# Patient Record
Sex: FEMALE | Race: WHITE | NOT HISPANIC OR LATINO
[De-identification: names, ages, dates, MRNs, and addresses within clinical notes are randomized per-mention and may not be internally consistent; named-entity substitution may affect disease eponyms.]

---

## 2019-09-29 ENCOUNTER — FORM ENCOUNTER (OUTPATIENT)
Age: 54
End: 2019-09-29

## 2019-10-02 ENCOUNTER — APPOINTMENT (OUTPATIENT)
Dept: SURGICAL ONCOLOGY | Facility: CLINIC | Age: 54
End: 2019-10-02
Payer: COMMERCIAL

## 2019-10-02 VITALS
SYSTOLIC BLOOD PRESSURE: 122 MMHG | BODY MASS INDEX: 29.92 KG/M2 | HEIGHT: 70 IN | DIASTOLIC BLOOD PRESSURE: 80 MMHG | HEART RATE: 102 BPM | WEIGHT: 209 LBS

## 2019-10-02 DIAGNOSIS — Z87.891 PERSONAL HISTORY OF NICOTINE DEPENDENCE: ICD-10-CM

## 2019-10-02 DIAGNOSIS — Z78.9 OTHER SPECIFIED HEALTH STATUS: ICD-10-CM

## 2019-10-02 PROCEDURE — 10061 I&D ABSCESS COMP/MULTIPLE: CPT | Mod: 79

## 2019-10-02 PROCEDURE — 99245 OFF/OP CONSLTJ NEW/EST HI 55: CPT | Mod: 25

## 2019-10-02 NOTE — CONSULT LETTER
[Dear  ___] : Dear  [unfilled], [Consult Letter:] : I had the pleasure of evaluating your patient, [unfilled]. [Consult Closing:] : Thank you very much for allowing me to participate in the care of this patient.  If you have any questions, please do not hesitate to contact me. [Sincerely,] : Sincerely, [DrMatthias  ___] : Dr. CARMONA [FreeTextEntry2] : Marycarmen Dee MD [FreeTextEntry1] : 54 year-old female presents for initial consultation.  She states that she is prone to epidermal inclusion cysts and has had multiple cysts excised in the past.  She currently has a cyst on her left upper back which periodically becomes inflamed.  It is currently inflamed and she had seen Dr. Dee regarding it who sent her here today.   She has not been on antibiotics.  The cyst is painful and tender but she denies any systemic symptoms.\par \par Her past medical history includes Hodgkins disease (1986), thyroid nodules, cervical cancer (s/p hysterectomy 2000).  She had a spinal fusion in 2018.  She has a family history of breast cancer involving a paternal aunt, ovarian cancer in her paternal grandmother, prostate cancer involving 2 paternal uncles, pancreatic cancer in her maternal uncle, colon cancer involving her father at 61.  Rand is up to date with her screening colonoscopies.  Rand is BRCA negative- but has not undergone any comprehensive genetic testing.  \par \par A&P:\par Patient presents with infected epidermal inclusion cyst involving her left upper back.  This has continued to wax and wane overtime.  Explained to patient that excision of cyst wall is the most definitive management.   Lesion I&D'ed in the office today and patient prescribed a course of antibiotics- patient  instructed to pack wound daily.  Will ultimately schedule excision of cyst wall.  Options, risks and benefits of surgery discussed with patient.  I have reviewed the pertinent imaging, bloodwork and pathology.  She has a significant family history of various cancers as well as a personal history of cervical cancer and Hodgkins disease.  Patient counseled on availability of comprehensive genetic testing which she will consider.\par \par Referring MD/Derm: Dr. Marycarmen Dee\par PCP: Dr. Juan Jose Quigley\par  [FreeTextEntry3] : Arian Martines MD, FACS, FASCRS\par , Department of Surgery\par Director of the [facility] [place]\par , Minimally Invasive/Robotic Cancer Surgery, Central & Eastern Divisions\par Division of Surgical Oncology

## 2019-10-02 NOTE — PHYSICAL EXAM
[Normal] : supple, no neck mass and thyroid not enlarged [Normal Neck Lymph Nodes] : normal neck lymph nodes  [Normal Supraclavicular Lymph Nodes] : normal supraclavicular lymph nodes [Normal Axillary Lymph Nodes] : normal axillary lymph nodes [Normal] : oriented to person, place and time, with appropriate affect [de-identified] : Left upper back sebaceous cyst with overlying erythema, tenderness and fluctuance

## 2019-10-02 NOTE — HISTORY OF PRESENT ILLNESS
[de-identified] : 54 year-old female presents for initial consultation.  She states that she is prone to epidermal inclusion cysts and has had multiple cysts excised in the past.  She currently has a cyst on her left upper back which periodically becomes inflamed.  It is currently inflamed and she had seen Dr. Dee regarding it who sent her here today.   She has not been on antibiotics.  The cyst is painful and tender but she denies any systemic symptoms.\par \par Her past medical history includes Hodgkins disease (1986), thyroid nodules, cervical cancer (s/p hysterectomy 2000).  She had a spinal fusion in 2018.  She has a family history of breast cancer involving a paternal aunt, ovarian cancer in her paternal grandmother, prostate cancer involving 2 paternal uncles, pancreatic cancer in her maternal uncle, colon cancer involving her father at 61.  Rand is up to date with her screening colonoscopies.  Rand is BRCA negative- but has not undergone any comprehensive genetic testing.  \par \par Referring MD/Derm: Dr. Marycarmen Dee\par PCP: Dr. Juan Jose Quigley\par

## 2019-10-02 NOTE — ASSESSMENT
[FreeTextEntry1] : 54 year-old female presents for initial consultation.  She states that she is prone to epidermal inclusion cysts and has had multiple cysts excised in the past.  She currently has a cyst on her left upper back which periodically becomes inflamed.  It is currently inflamed and she had seen Dr. Dee regarding it who sent her here today.   She has not been on antibiotics.  The cyst is painful and tender but she denies any systemic symptoms.\par \par Her past medical history includes Hodgkins disease (1986), thyroid nodules, cervical cancer (s/p hysterectomy 2000).  She had a spinal fusion in 2018.  She has a family history of breast cancer involving a paternal aunt, ovarian cancer in her paternal grandmother, prostate cancer involving 2 paternal uncles, pancreatic cancer in her maternal uncle, colon cancer involving her father at 61.  Rand is up to date with her screening colonoscopies.  Rand is BRCA negative- but has not undergone any comprehensive genetic testing.  \par \par A&P:\par Patient presents with infected epidermal inclusion cyst involving her left upper back.  This has continued to wax and wane overtime.  Explained to patient that excision of cyst wall is the most definitive management.   Lesion I&D'ed in the office today and patient prescribed a course of antibiotics- patient  instructed to pack wound daily.  Will ultimately schedule excision of cyst wall.  Options, risks and benefits of surgery discussed with patient.  I have reviewed the pertinent imaging, bloodwork and pathology.  She has a significant family history of various cancers as well as a personal history of cervical cancer and Hodgkins disease.  Patient counseled on availability of comprehensive genetic testing which she will consider.\par \par abscess drained/packed\par \par

## 2019-10-29 ENCOUNTER — APPOINTMENT (OUTPATIENT)
Dept: GASTROENTEROLOGY | Facility: CLINIC | Age: 54
End: 2019-10-29
Payer: COMMERCIAL

## 2019-10-29 VITALS
DIASTOLIC BLOOD PRESSURE: 70 MMHG | HEIGHT: 70 IN | OXYGEN SATURATION: 99 % | TEMPERATURE: 98.7 F | SYSTOLIC BLOOD PRESSURE: 120 MMHG | BODY MASS INDEX: 30.64 KG/M2 | WEIGHT: 214 LBS | HEART RATE: 68 BPM

## 2019-10-29 DIAGNOSIS — K62.1 RECTAL POLYP: ICD-10-CM

## 2019-10-29 DIAGNOSIS — Z12.11 ENCOUNTER FOR SCREENING FOR MALIGNANT NEOPLASM OF COLON: ICD-10-CM

## 2019-10-29 DIAGNOSIS — Z80.0 ENCOUNTER FOR SCREENING FOR MALIGNANT NEOPLASM OF COLON: ICD-10-CM

## 2019-10-29 DIAGNOSIS — Z80.0 FAMILY HISTORY OF MALIGNANT NEOPLASM OF DIGESTIVE ORGANS: ICD-10-CM

## 2019-10-29 DIAGNOSIS — Z83.79 FAMILY HISTORY OF OTHER DISEASES OF THE DIGESTIVE SYSTEM: ICD-10-CM

## 2019-10-29 PROCEDURE — 99243 OFF/OP CNSLTJ NEW/EST LOW 30: CPT

## 2019-10-29 RX ORDER — CEFADROXIL 500 MG/1
500 CAPSULE ORAL TWICE DAILY
Qty: 28 | Refills: 0 | Status: COMPLETED | COMMUNITY
Start: 2019-10-02 | End: 2019-10-29

## 2019-10-29 RX ORDER — DICLOFENAC SODIUM 75 MG/1
75 TABLET, DELAYED RELEASE ORAL
Qty: 60 | Refills: 0 | Status: DISCONTINUED | COMMUNITY
Start: 2019-08-22 | End: 2019-10-29

## 2019-10-29 NOTE — CONSULT LETTER
[Dear  ___] : Dear  [unfilled], [Consult Letter:] : I had the pleasure of evaluating your patient, [unfilled]. [Please see my note below.] : Please see my note below. [Consult Closing:] : Thank you very much for allowing me to participate in the care of this patient.  If you have any questions, please do not hesitate to contact me. [Sincerely,] : Sincerely, [FreeTextEntry3] : Caesar Nicole MD, FACG\par

## 2019-10-29 NOTE — HISTORY OF PRESENT ILLNESS
[Heartburn] : denies heartburn [Nausea] : denies nausea [Vomiting] : denies vomiting [Diarrhea] : denies diarrhea [Constipation] : stable constipation [Yellow Skin Or Eyes (Jaundice)] : denies jaundice [Abdominal Pain] : denies abdominal pain [Abdominal Swelling] : denies abdominal swelling [Rectal Pain] : denies rectal pain [Wt Loss ___ Lbs] : recent [unfilled] ~Upound(s) weight loss [Wt Gain ___ Lbs] : no recent weight gain [GERD] : no gastroesophageal reflux disease [Hiatus Hernia] : no hiatus hernia [Peptic Ulcer Disease] : no peptic ulcer disease [Pancreatitis] : no pancreatitis [Cholelithiasis] : no cholelithiasis [Kidney Stone] : no kidney stone [Inflammatory Bowel Disease] : no inflammatory bowel disease [Irritable Bowel Syndrome] : no irritable bowel syndrome [Diverticulitis] : no diverticulitis [Alcohol Abuse] : no alcohol abuse [Malignancy] : no malignancy [Abdominal Surgery] : no abdominal surgery [Appendectomy] : no appendectomy [Cholecystectomy] : no cholecystectomy [de-identified] : 54 year old woman referred for a screening colonoscopy. her last colonoscopy was over 5 years go and she had a small hyperplastic polyp removed. She has a family history of colon cancer. She denies rectal bleeding,melena or hematemesis.

## 2019-11-05 ENCOUNTER — OUTPATIENT (OUTPATIENT)
Dept: OUTPATIENT SERVICES | Facility: HOSPITAL | Age: 54
LOS: 1 days | End: 2019-11-05
Payer: COMMERCIAL

## 2019-11-05 VITALS
TEMPERATURE: 98 F | SYSTOLIC BLOOD PRESSURE: 136 MMHG | RESPIRATION RATE: 14 BRPM | WEIGHT: 209 LBS | DIASTOLIC BLOOD PRESSURE: 70 MMHG | HEIGHT: 69.25 IN | HEART RATE: 90 BPM | OXYGEN SATURATION: 97 %

## 2019-11-05 DIAGNOSIS — L72.0 EPIDERMAL CYST: ICD-10-CM

## 2019-11-05 DIAGNOSIS — T78.40XS ALLERGY, UNSPECIFIED, SEQUELA: ICD-10-CM

## 2019-11-05 DIAGNOSIS — Z98.890 OTHER SPECIFIED POSTPROCEDURAL STATES: Chronic | ICD-10-CM

## 2019-11-05 DIAGNOSIS — Z90.710 ACQUIRED ABSENCE OF BOTH CERVIX AND UTERUS: Chronic | ICD-10-CM

## 2019-11-05 LAB
ANION GAP SERPL CALC-SCNC: 16 MMO/L — HIGH (ref 7–14)
BUN SERPL-MCNC: 17 MG/DL — SIGNIFICANT CHANGE UP (ref 7–23)
CALCIUM SERPL-MCNC: 10.1 MG/DL — SIGNIFICANT CHANGE UP (ref 8.4–10.5)
CHLORIDE SERPL-SCNC: 101 MMOL/L — SIGNIFICANT CHANGE UP (ref 98–107)
CO2 SERPL-SCNC: 23 MMOL/L — SIGNIFICANT CHANGE UP (ref 22–31)
CREAT SERPL-MCNC: 0.75 MG/DL — SIGNIFICANT CHANGE UP (ref 0.5–1.3)
GLUCOSE SERPL-MCNC: 94 MG/DL — SIGNIFICANT CHANGE UP (ref 70–99)
HCT VFR BLD CALC: 43.3 % — SIGNIFICANT CHANGE UP (ref 34.5–45)
HGB BLD-MCNC: 14 G/DL — SIGNIFICANT CHANGE UP (ref 11.5–15.5)
MCHC RBC-ENTMCNC: 28.9 PG — SIGNIFICANT CHANGE UP (ref 27–34)
MCHC RBC-ENTMCNC: 32.3 % — SIGNIFICANT CHANGE UP (ref 32–36)
MCV RBC AUTO: 89.5 FL — SIGNIFICANT CHANGE UP (ref 80–100)
NRBC # FLD: 0 K/UL — SIGNIFICANT CHANGE UP (ref 0–0)
PLATELET # BLD AUTO: 212 K/UL — SIGNIFICANT CHANGE UP (ref 150–400)
PMV BLD: 9.7 FL — SIGNIFICANT CHANGE UP (ref 7–13)
POTASSIUM SERPL-MCNC: 4.3 MMOL/L — SIGNIFICANT CHANGE UP (ref 3.5–5.3)
POTASSIUM SERPL-SCNC: 4.3 MMOL/L — SIGNIFICANT CHANGE UP (ref 3.5–5.3)
RBC # BLD: 4.84 M/UL — SIGNIFICANT CHANGE UP (ref 3.8–5.2)
RBC # FLD: 12.4 % — SIGNIFICANT CHANGE UP (ref 10.3–14.5)
SODIUM SERPL-SCNC: 140 MMOL/L — SIGNIFICANT CHANGE UP (ref 135–145)
WBC # BLD: 6.98 K/UL — SIGNIFICANT CHANGE UP (ref 3.8–10.5)
WBC # FLD AUTO: 6.98 K/UL — SIGNIFICANT CHANGE UP (ref 3.8–10.5)

## 2019-11-05 PROCEDURE — 93010 ELECTROCARDIOGRAM REPORT: CPT

## 2019-11-05 RX ORDER — SODIUM CHLORIDE 9 MG/ML
1000 INJECTION, SOLUTION INTRAVENOUS
Refills: 0 | Status: DISCONTINUED | OUTPATIENT
Start: 2019-11-11 | End: 2019-11-28

## 2019-11-05 NOTE — H&P PST ADULT - MUSCULOSKELETAL
details… detailed exam no joint erythema/no joint warmth/no joint swelling/no calf tenderness/normal strength/ROM intact

## 2019-11-05 NOTE — H&P PST ADULT - RS GEN PE MLT RESP DETAILS PC
clear to auscultation bilaterally/breath sounds equal/good air movement/no rhonchi/respirations non-labored/no rales/no wheezes

## 2019-11-05 NOTE — H&P PST ADULT - NSICDXPROBLEM_GEN_ALL_CORE_FT
PROBLEM DIAGNOSES  Problem: Epidermal cyst  Assessment and Plan: wide excision back mass   Medical clearance as per Dr Martines PROBLEM DIAGNOSES  Problem: Epidermal cyst  Assessment and Plan: wide excision back mass   Medical clearance as per Dr Martines     Problem: Allergy, sequela  Assessment and Plan: PCN, epinepherine ; OR faxed

## 2019-11-05 NOTE — H&P PST ADULT - NSICDXPASTSURGICALHX_GEN_ALL_CORE_FT
PAST SURGICAL HISTORY:  H/O lymph node biopsy     S/P hysterectomy 2000    S/P lumbar spine operation hardware in place

## 2019-11-05 NOTE — H&P PST ADULT - HISTORY OF PRESENT ILLNESS
This is a 54 y.o. female s/p intra-office excision of epidermal cyst . Pt now for wide excision . Pt offers no complaints in pst .

## 2019-11-10 ENCOUNTER — TRANSCRIPTION ENCOUNTER (OUTPATIENT)
Age: 54
End: 2019-11-10

## 2019-11-11 ENCOUNTER — RESULT REVIEW (OUTPATIENT)
Age: 54
End: 2019-11-11

## 2019-11-11 ENCOUNTER — APPOINTMENT (OUTPATIENT)
Dept: SURGICAL ONCOLOGY | Facility: HOSPITAL | Age: 54
End: 2019-11-11

## 2019-11-11 ENCOUNTER — OUTPATIENT (OUTPATIENT)
Dept: OUTPATIENT SERVICES | Facility: HOSPITAL | Age: 54
LOS: 1 days | Discharge: HOME | End: 2019-11-11
Payer: COMMERCIAL

## 2019-11-11 VITALS
OXYGEN SATURATION: 96 % | RESPIRATION RATE: 14 BRPM | TEMPERATURE: 98 F | HEIGHT: 69.25 IN | WEIGHT: 209 LBS | SYSTOLIC BLOOD PRESSURE: 114 MMHG | DIASTOLIC BLOOD PRESSURE: 76 MMHG | HEART RATE: 87 BPM

## 2019-11-11 VITALS
OXYGEN SATURATION: 97 % | HEART RATE: 83 BPM | DIASTOLIC BLOOD PRESSURE: 67 MMHG | RESPIRATION RATE: 16 BRPM | TEMPERATURE: 98 F | SYSTOLIC BLOOD PRESSURE: 120 MMHG

## 2019-11-11 DIAGNOSIS — Z98.890 OTHER SPECIFIED POSTPROCEDURAL STATES: Chronic | ICD-10-CM

## 2019-11-11 DIAGNOSIS — Z90.710 ACQUIRED ABSENCE OF BOTH CERVIX AND UTERUS: Chronic | ICD-10-CM

## 2019-11-11 DIAGNOSIS — L72.0 EPIDERMAL CYST: ICD-10-CM

## 2019-11-11 PROCEDURE — 13102 CMPLX RPR TRUNK ADDL 5CM/<: CPT

## 2019-11-11 PROCEDURE — 13101 CMPLX RPR TRUNK 2.6-7.5 CM: CPT

## 2019-11-11 PROCEDURE — 88305 TISSUE EXAM BY PATHOLOGIST: CPT | Mod: 26

## 2019-11-11 PROCEDURE — 21933 EXC BACK TUM DEEP 5 CM/>: CPT

## 2019-11-11 RX ORDER — FENTANYL CITRATE 50 UG/ML
50 INJECTION INTRAVENOUS
Refills: 0 | Status: DISCONTINUED | OUTPATIENT
Start: 2019-11-11 | End: 2019-11-11

## 2019-11-11 RX ORDER — SODIUM CHLORIDE 9 MG/ML
1000 INJECTION, SOLUTION INTRAVENOUS
Refills: 0 | Status: DISCONTINUED | OUTPATIENT
Start: 2019-11-11 | End: 2019-11-28

## 2019-11-11 RX ORDER — ONDANSETRON 8 MG/1
4 TABLET, FILM COATED ORAL ONCE
Refills: 0 | Status: DISCONTINUED | OUTPATIENT
Start: 2019-11-11 | End: 2019-11-28

## 2019-11-11 RX ORDER — ASCORBIC ACID 60 MG
4 TABLET,CHEWABLE ORAL
Qty: 0 | Refills: 0 | DISCHARGE

## 2019-11-11 RX ORDER — ACETAMINOPHEN 500 MG
2 TABLET ORAL
Qty: 0 | Refills: 0 | DISCHARGE

## 2019-11-11 RX ORDER — PREGABALIN 225 MG/1
1 CAPSULE ORAL
Qty: 0 | Refills: 0 | DISCHARGE

## 2019-11-11 NOTE — BRIEF OPERATIVE NOTE - NSICDXBRIEFPOSTOP_GEN_ALL_CORE_FT
POST-OP DIAGNOSIS:  Epidermal inclusion cyst 11-Nov-2019 08:43:55  Charles Huffman
POST-OP DIAGNOSIS:  Epidermal inclusion cyst 11-Nov-2019 08:43:55  Charles Huffman

## 2019-11-11 NOTE — ASU DISCHARGE PLAN (ADULT/PEDIATRIC) - CARE PROVIDER_API CALL
Arian Martines)  ColonRectal Surgery; Surgery  [address] [place] [address]  Phone: [phone]  Fax: [phone]  Follow Up Time: 1 week

## 2019-11-11 NOTE — BRIEF OPERATIVE NOTE - NSICDXBRIEFPROCEDURE_GEN_ALL_CORE_FT
PROCEDURES:  Excision of benign skin lesion (excluding skin tags) of back, over 4.0cm 11-Nov-2019 08:43:24  Charles Huffman
PROCEDURES:  Complex repair, wound, torso, 2.6 cm to 7.5 cm 11-Nov-2019 09:18:17  Rusty Victoria

## 2019-11-11 NOTE — ASU DISCHARGE PLAN (ADULT/PEDIATRIC) - ASU DC SPECIAL INSTRUCTIONSFT
WOUND CARE:  Please keep incisions clean and dry. Please do not Scrub or rub incisions. Do not use lotion or powder on incisions.   BATHING: Please do not submerge wound underwater. You may shower and pat dry  ACTIVITY: No heavy lifting or straining. Otherwise, you may return to your usual level of physical activity  DIET: Normal diet  NOTIFY YOUR SURGEON IF: You have any bleeding that does not stop, any pus draining from your wounds, any fever (over 101 F), or if your pain is not controlled on Tylenol.  FOLLOW-UP: Please follow up with Dr. Martines in 1-2 weeks. Call for an appointment.

## 2019-11-11 NOTE — BRIEF OPERATIVE NOTE - OPERATION/FINDINGS
Epidermal inclusion cyst of posterior L shoulder. Wide local excision with complex closure by Plastics

## 2019-11-11 NOTE — ASU DISCHARGE PLAN (ADULT/PEDIATRIC) - PROCEDURE
Wide local excision of Epidermal inclusion cyst of posterior left shoulder with complex closure by Plastics

## 2019-11-11 NOTE — ASU DISCHARGE PLAN (ADULT/PEDIATRIC) - CALL YOUR DOCTOR IF YOU HAVE ANY OF THE FOLLOWING:
Fever greater than (need to indicate Fahrenheit or Celsius)/Swelling that gets worse/Pain not relieved by Medications/Bleeding that does not stop Inability to tolerate liquids or foods/Fever greater than (need to indicate Fahrenheit or Celsius)/Swelling that gets worse/Pain not relieved by Medications/Bleeding that does not stop/Nausea and vomiting that does not stop

## 2019-11-19 ENCOUNTER — APPOINTMENT (OUTPATIENT)
Dept: PLASTIC SURGERY | Facility: CLINIC | Age: 54
End: 2019-11-19
Payer: COMMERCIAL

## 2019-11-19 PROCEDURE — 99024 POSTOP FOLLOW-UP VISIT: CPT

## 2019-11-20 LAB — SURGICAL PATHOLOGY STUDY: SIGNIFICANT CHANGE UP

## 2019-12-02 ENCOUNTER — APPOINTMENT (OUTPATIENT)
Dept: SURGICAL ONCOLOGY | Facility: CLINIC | Age: 54
End: 2019-12-02
Payer: COMMERCIAL

## 2019-12-02 VITALS
BODY MASS INDEX: 30.92 KG/M2 | SYSTOLIC BLOOD PRESSURE: 129 MMHG | HEART RATE: 108 BPM | OXYGEN SATURATION: 96 % | HEIGHT: 70 IN | DIASTOLIC BLOOD PRESSURE: 85 MMHG | WEIGHT: 216 LBS | TEMPERATURE: 98.6 F | RESPIRATION RATE: 18 BRPM

## 2019-12-02 PROCEDURE — 99024 POSTOP FOLLOW-UP VISIT: CPT

## 2019-12-02 NOTE — ASSESSMENT
[FreeTextEntry1] : \par 54 year-old female presents for initial postop visit, status post excision of epidermal inclusion cyst from the left back on 11/11/19.  Plastic closure was performed by Dr. Ezekiel Jalloh.  Final pathology demonstrated hyperplastic scar and no residual cyst.  She is feeling well and denies pain fever or chills.  She recently saw her dermatologist who biopsied 2 dermal lesions on her back, one of which was basal cell carcinoma (which was excised) and pathology is pending on the second biopsy which was performed later on.  \par \par Previous pertinent history is as follows:\par \par She was initially seen in consultation on 10/2/19.  She stated that she is prone to epidermal inclusion cysts and has had multiple cysts excised in the past.  She presented with a cyst on her left upper back which periodically becomes inflamed.  It was inflamed and she had seen Dr. Dee regarding it who referred her to me.   She had not been on antibiotics.  The cyst was painful and tender but she denied any systemic symptoms.\par \par The lesion was I&D'ed in the office and she was prescribed a course of antibiotics.\par \par Her past medical history includes Hodgkins disease (1986), thyroid nodules, cervical cancer (s/p hysterectomy 2000).  She had a spinal fusion in 2018.  She has a family history of breast cancer involving a paternal aunt, ovarian cancer in her paternal grandmother, prostate cancer involving 2 paternal uncles, pancreatic cancer in her maternal uncle, colon cancer involving her father at 61.  Rand is up to date with her screening colonoscopies.  Rand is BRCA negative- but has not undergone any comprehensive genetic testing.  \par \par A&P:\par Status post excision of epidermal inclusion cyst from the left back on 11/11/19.  Plastic closure was performed by Dr. Ezekiel Jalloh.  Final pathology demonstrated hyperplastic scar and no residual cyst.  Patient doing well clinically. Continue ongoing dermatologic surveillance and return to office as needed.  I have enclosed a copy of the pathology report for your records. \par

## 2019-12-02 NOTE — PHYSICAL EXAM
[Normal] : well developed, well nourished, in no acute distress [de-identified] : Left upper back incision well-approximated with no evidence of infection or seroma.

## 2019-12-02 NOTE — CONSULT LETTER
[Consult Letter:] : I had the pleasure of evaluating your patient, [unfilled]. [Dear  ___] : Dear  [unfilled], [Sincerely,] : Sincerely, [Consult Closing:] : Thank you very much for allowing me to participate in the care of this patient.  If you have any questions, please do not hesitate to contact me. [DrMatthias  ___] : Dr. CARMONA [FreeTextEntry2] : Marycarmen Dee MD [FreeTextEntry3] : Arian Martines MD, FACS, FASCRS\par , Department of Surgery\par Director of the [facility] [place]\par , Minimally Invasive/Robotic Cancer Surgery, Central & Eastern Divisions\par Division of Surgical Oncology  [FreeTextEntry1] : \par 54 year-old female presents for initial postop visit, status post excision of epidermal inclusion cyst from the left back on 11/11/19.  Plastic closure was performed by Dr. Ezekiel Jalloh.  Final pathology demonstrated hyperplastic scar and no residual cyst.  She is feeling well and denies pain fever or chills.  She recently saw her dermatologist who biopsied 2 dermal lesions on her back, one of which was basal cell carcinoma (which was excised) and pathology is pending on the second biopsy which was performed later on.  \par \par Previous pertinent history is as follows:\par \par She was initially seen in consultation on 10/2/19.  She stated that she is prone to epidermal inclusion cysts and has had multiple cysts excised in the past.  She presented with a cyst on her left upper back which periodically becomes inflamed.  It was inflamed and she had seen Dr. Dee regarding it who referred her to me.   She had not been on antibiotics.  The cyst was painful and tender but she denied any systemic symptoms.\par \par The lesion was I&D'ed in the office and she was prescribed a course of antibiotics.\par \par Her past medical history includes Hodgkins disease (1986), thyroid nodules, cervical cancer (s/p hysterectomy 2000).  She had a spinal fusion in 2018.  She has a family history of breast cancer involving a paternal aunt, ovarian cancer in her paternal grandmother, prostate cancer involving 2 paternal uncles, pancreatic cancer in her maternal uncle, colon cancer involving her father at 61.  Rand is up to date with her screening colonoscopies.  Rand is BRCA negative- but has not undergone any comprehensive genetic testing.  \par \par A&P:\par Status post excision of epidermal inclusion cyst from the left back on 11/11/19.  Plastic closure was performed by Dr. Ezekiel Jalloh.  Final pathology demonstrated hyperplastic scar and no residual cyst.  Patient doing well clinically. Continue ongoing dermatologic surveillance and return to office as needed.  I have enclosed a copy of the pathology report for your records. \par \par Referring MD/Derm: Dr. Marycarmen Dee\par PCP: Dr. Juan Jose Quigley\par \par enclose pathology

## 2019-12-02 NOTE — REASON FOR VISIT
[Post-Op] : a post-op for [FreeTextEntry2] : status post excision of epidermal inclusion cyst from the left back on 11/11/19

## 2019-12-11 ENCOUNTER — APPOINTMENT (OUTPATIENT)
Dept: GASTROENTEROLOGY | Facility: AMBULATORY MEDICAL SERVICES | Age: 54
End: 2019-12-11
Payer: COMMERCIAL

## 2019-12-11 PROCEDURE — 45380 COLONOSCOPY AND BIOPSY: CPT | Mod: 33

## 2020-01-21 ENCOUNTER — TRANSCRIPTION ENCOUNTER (OUTPATIENT)
Age: 55
End: 2020-01-21

## 2020-02-23 NOTE — HISTORY OF PRESENT ILLNESS
[FreeTextEntry1] : 54 year female presents for follow up status post Epidermal cyst excision left back with complex closure. Patient is doing well no fever no chills no drainage pain well-controlled

## 2020-02-23 NOTE — REASON FOR VISIT
[Post Op: _________] : a [unfilled] post op visit [FreeTextEntry1] : DOS 11/11/19 s/p left back epidermal cyst excision and closure. Pt states she is doing well.

## 2020-04-02 NOTE — H&P PST ADULT - FALLEN IN THE PAST
no

## 2020-07-24 NOTE — HISTORY OF PRESENT ILLNESS
Patient was not ready to schedule echo. He will call us to get scheduled when he is ready. [de-identified] : 54 year-old female presents for initial postop visit, status post excision of epidermal inclusion cyst from the left back on 11/11/19.  Plastic closure was performed by Dr. Ezekiel Jalloh.  Final pathology demonstrated hyperplastic scar and no residual cyst.  She is feeling well and denies pain fever or chills.  She recently saw her dermatologist who biopsied 2 dermal lesions on her back, one of which was basal cell carcinoma (which was excised) and pathology is pending on the second biopsy which was performed later on.  \par \par Previous pertinent history is as follows:\par \par She was initially seen in consultation on 10/2/19.  She stated that she is prone to epidermal inclusion cysts and has had multiple cysts excised in the past.  She presented with a cyst on her left upper back which periodically becomes inflamed.  It was inflamed and she had seen Dr. Dee regarding it who referred her to me.   She had not been on antibiotics.  The cyst was painful and tender but she denied any systemic symptoms.\par \par The lesion was I&D'ed in the office and she was prescribed a course of antibiotics.\par \par Her past medical history includes Hodgkins disease (1986), thyroid nodules, cervical cancer (s/p hysterectomy 2000).  She had a spinal fusion in 2018.  She has a family history of breast cancer involving a paternal aunt, ovarian cancer in her paternal grandmother, prostate cancer involving 2 paternal uncles, pancreatic cancer in her maternal uncle, colon cancer involving her father at 61.  Rand is up to date with her screening colonoscopies.  Rand is BRCA negative- but has not undergone any comprehensive genetic testing.  \par \par Referring MD/Derm: Dr. Marycarmen Dee\par PCP: Dr. Juan Jose Quigley\par

## 2020-09-03 ENCOUNTER — APPOINTMENT (OUTPATIENT)
Dept: PLASTIC SURGERY | Facility: CLINIC | Age: 55
End: 2020-09-03
Payer: COMMERCIAL

## 2020-09-03 VITALS
SYSTOLIC BLOOD PRESSURE: 107 MMHG | HEART RATE: 91 BPM | WEIGHT: 167 LBS | BODY MASS INDEX: 23.91 KG/M2 | HEIGHT: 70 IN | TEMPERATURE: 98 F | DIASTOLIC BLOOD PRESSURE: 69 MMHG | OXYGEN SATURATION: 97 %

## 2020-09-03 PROCEDURE — XXXXX: CPT

## 2020-09-22 ENCOUNTER — LABORATORY RESULT (OUTPATIENT)
Age: 55
End: 2020-09-22

## 2020-09-22 ENCOUNTER — APPOINTMENT (OUTPATIENT)
Dept: PLASTIC SURGERY | Facility: CLINIC | Age: 55
End: 2020-09-22
Payer: COMMERCIAL

## 2020-09-22 PROCEDURE — 14001 TIS TRNFR TRUNK 10.1-30SQCM: CPT

## 2020-09-22 PROCEDURE — 21931 EXC BACK LES SC 3 CM/>: CPT

## 2020-09-22 NOTE — CONSULT LETTER
[Dear  ___] : Dear  [unfilled], [Consult Letter:] : I had the pleasure of evaluating your patient, [unfilled]. [Consult Closing:] : Thank you very much for allowing me to participate in the care of this patient.  If you have any questions, please do not hesitate to contact me. [Please see my note below.] : Please see my note below. [Sincerely,] : Sincerely, [FreeTextEntry3] : Gavin Schwarz MD

## 2020-09-30 ENCOUNTER — APPOINTMENT (OUTPATIENT)
Dept: PLASTIC SURGERY | Facility: CLINIC | Age: 55
End: 2020-09-30
Payer: COMMERCIAL

## 2020-09-30 VITALS
TEMPERATURE: 98.1 F | HEART RATE: 85 BPM | BODY MASS INDEX: 23.91 KG/M2 | SYSTOLIC BLOOD PRESSURE: 110 MMHG | DIASTOLIC BLOOD PRESSURE: 71 MMHG | WEIGHT: 167 LBS | OXYGEN SATURATION: 100 % | HEIGHT: 70 IN

## 2020-09-30 PROCEDURE — 99024 POSTOP FOLLOW-UP VISIT: CPT

## 2020-10-13 ENCOUNTER — APPOINTMENT (OUTPATIENT)
Dept: PLASTIC SURGERY | Facility: CLINIC | Age: 55
End: 2020-10-13
Payer: COMMERCIAL

## 2020-10-13 VITALS
HEART RATE: 83 BPM | BODY MASS INDEX: 23.91 KG/M2 | TEMPERATURE: 97.5 F | HEIGHT: 70 IN | DIASTOLIC BLOOD PRESSURE: 63 MMHG | WEIGHT: 167 LBS | OXYGEN SATURATION: 100 % | SYSTOLIC BLOOD PRESSURE: 109 MMHG

## 2020-10-13 PROCEDURE — 99024 POSTOP FOLLOW-UP VISIT: CPT

## 2020-11-15 ENCOUNTER — FORM ENCOUNTER (OUTPATIENT)
Age: 55
End: 2020-11-15

## 2020-12-21 PROBLEM — Z12.11 ENCOUNTER FOR COLONOSCOPY IN PATIENT WITH FAMILY HISTORY OF COLON CANCER: Status: RESOLVED | Noted: 2019-10-29 | Resolved: 2020-12-21

## 2020-12-28 ENCOUNTER — FORM ENCOUNTER (OUTPATIENT)
Age: 55
End: 2020-12-28

## 2021-01-14 NOTE — REASON FOR VISIT
[Procedure: _________] : a [unfilled] procedure visit [FreeTextEntry1] : Pt presents in the office today for a excisional biopsy and closure of lower back mass.

## 2021-01-14 NOTE — PROCEDURE
[Nl] : None [FreeTextEntry1] : MASS OF MID BACK [FreeTextEntry2] : EXCISIONAL BIOPSY OF MID BACK MASS 3 cm, local advancement flap 4 x 3cm [FreeTextEntry6] : After the area was prepped and draped, the area was infiltrated with 1%lidocaine with epi. The previous scar/mass was marked and incised. The mass was dissected from surrounding tissue. The mass and overlying skin was removed. It measured 3cm. This was sent to pathology.\par \par The wound was then irrigated, and given size and location of the wound, a local flap was marked. The flap was incised and elevated. The flap was then advanced into the area providing a good reconstruction. The flap was then inset with 3-0 monocryl for the dermis and 4-0 nylon for the skin. The total area including the wound was 4 x 3cm. A dressing was applied. Pt tolerated well procedure.\par  [FreeTextEntry7] : Mid back mass

## 2021-11-15 NOTE — H&P PST ADULT - [place] EXT PE MLT D E PC
What Type Of Note Output Would You Prefer (Optional)?: Standard Output Is The Patient Presenting As Previously Scheduled?: Yes How Severe Is Your Rash?: severe Is This A New Presentation, Or A Follow-Up?: Rash Additional History: About 8 years ago, she flared with this itching and rash, it lasted about a month, treatment with gabapentin and topical steroids. It has started back over the last few months (neck, hands, arms), now it is flaring all over. \\n\\nShe recently got a tattoo colored in September. She is wondering if that influenced her skin. She has been under more stress recently. \\n\\nNo changes in her health, she has history of seasonal allergies,  but does not take antihistamines regularly. \\n\\n no pedal edema/no clubbing/no cyanosis

## 2022-04-05 ENCOUNTER — APPOINTMENT (OUTPATIENT)
Dept: ORTHOPEDIC SURGERY | Facility: CLINIC | Age: 57
End: 2022-04-05
Payer: COMMERCIAL

## 2022-04-05 VITALS — BODY MASS INDEX: 30.06 KG/M2 | HEIGHT: 70 IN | WEIGHT: 210 LBS

## 2022-04-05 DIAGNOSIS — M93.271 OSTEOCHONDRITIS DISSECANS, RIGHT ANKLE AND JOINTS OF RIGHT FOOT: ICD-10-CM

## 2022-04-05 DIAGNOSIS — Z78.9 OTHER SPECIFIED HEALTH STATUS: ICD-10-CM

## 2022-04-05 PROCEDURE — 99213 OFFICE O/P EST LOW 20 MIN: CPT

## 2022-04-05 NOTE — HISTORY OF PRESENT ILLNESS
[3] : 3 [0] : 0 [Intermittent] : intermittent [Rest] : rest [Physical therapy] : physical therapy [Exercising] : exercising [Retired] : Work status: retired [de-identified] : 11/23/21: felt pop in achilles >3 months ago. saw dpm at the time, but no intervention. having continued pain/weakness. no prior foot probs. no dm/tob. not working. c/o lateral foot numbness. patient w/ h/o prior lumbar surgery\par 12/7/21: Here for R ankle f/u and MRI results. No changes in pain.\par 2/8/22: mild improvement. going to PT.\par 4/5/22: Still c/o minor tenderness over Achilles. States she doesn't experience her ankle giving out on her as often as it used to.  Attending PT. had improvement from injection [] : no [FreeTextEntry1] : right ankle [FreeTextEntry6] : soreness [FreeTextEntry8] : increased activity [de-identified] : physical therapy

## 2022-04-05 NOTE — PHYSICAL EXAM
[Right] : right foot and ankle [NL (40)] : plantar flexion 40 degrees [NL 30)] : inversion 30 degrees [NL (20)] : eversion 20 degrees [5___] : plantar flexion 5[unfilled]/5 [2+] : dorsalis pedis pulse: 2+ [] : patient ambulates without assistive device [de-identified] : decreased sensation lateral foot at baseline

## 2022-04-14 ENCOUNTER — APPOINTMENT (OUTPATIENT)
Dept: SURGERY | Facility: CLINIC | Age: 57
End: 2022-04-14
Payer: COMMERCIAL

## 2022-04-14 VITALS
HEIGHT: 70 IN | WEIGHT: 210 LBS | BODY MASS INDEX: 30.06 KG/M2 | DIASTOLIC BLOOD PRESSURE: 84 MMHG | OXYGEN SATURATION: 98 % | TEMPERATURE: 98.7 F | SYSTOLIC BLOOD PRESSURE: 126 MMHG | HEART RATE: 106 BPM

## 2022-04-14 PROCEDURE — 10061 I&D ABSCESS COMP/MULTIPLE: CPT | Mod: 59

## 2022-04-14 PROCEDURE — 99202 OFFICE O/P NEW SF 15 MIN: CPT | Mod: 25

## 2022-04-14 NOTE — PHYSICAL EXAM
[Normal Breath Sounds] : Normal breath sounds [Normal Heart Sounds] : normal heart sounds [Normal Rate and Rhythm] : normal rate and rhythm [Purpura] : no purpura  [Petechiae] : no petechiae [Skin Ulcer] : no ulcer [Skin Induration] : induration [Alert] : alert [Oriented to Person] : oriented to person [Oriented to Place] : oriented to place [Oriented to Time] : oriented to time [Calm] : calm [de-identified] : well developed female in no distress [de-identified] : benign [de-identified] : infection of right middle finger

## 2022-04-14 NOTE — ASSESSMENT
[FreeTextEntry1] : pt had an incision and drainage of her right middle finger and the incision was packed open and a DSD was placed.

## 2022-04-14 NOTE — HISTORY OF PRESENT ILLNESS
[de-identified] : infected right middle finger [de-identified] : 56 year old white female who presents c/o pain and swelling of her right middle finger for the last week, She denies any trauma to the area. No fevers or chills.

## 2022-04-19 ENCOUNTER — APPOINTMENT (OUTPATIENT)
Dept: SURGERY | Facility: CLINIC | Age: 57
End: 2022-04-19
Payer: COMMERCIAL

## 2022-04-19 PROCEDURE — 99024 POSTOP FOLLOW-UP VISIT: CPT

## 2022-04-19 NOTE — PHYSICAL EXAM
[Normal Breath Sounds] : Normal breath sounds [Normal Heart Sounds] : normal heart sounds [Normal Rate and Rhythm] : normal rate and rhythm [Calm] : calm [de-identified] : well developed female in no distress [de-identified] : benign [de-identified] : figer incision closed and swollen.

## 2022-04-21 ENCOUNTER — APPOINTMENT (OUTPATIENT)
Dept: SURGERY | Facility: CLINIC | Age: 57
End: 2022-04-21

## 2022-04-25 ENCOUNTER — APPOINTMENT (OUTPATIENT)
Dept: SURGERY | Facility: CLINIC | Age: 57
End: 2022-04-25
Payer: COMMERCIAL

## 2022-04-25 VITALS — TEMPERATURE: 98 F

## 2022-04-25 PROCEDURE — 99212 OFFICE O/P EST SF 10 MIN: CPT

## 2022-04-25 NOTE — PHYSICAL EXAM
[Normal Breath Sounds] : Normal breath sounds [Normal Heart Sounds] : normal heart sounds [Normal Rate and Rhythm] : normal rate and rhythm [Calm] : calm [de-identified] : well developed female in no distress [de-identified] : benign [de-identified] : finger clean and closing with decreased erythema

## 2022-04-25 NOTE — HISTORY OF PRESENT ILLNESS
[de-identified] : infected right middle finger [de-identified] : pt improved, decreased swelling and pain, drainage has stopped. Pt denies any fevers or chills.

## 2022-05-10 ENCOUNTER — APPOINTMENT (OUTPATIENT)
Dept: SURGERY | Facility: CLINIC | Age: 57
End: 2022-05-10
Payer: COMMERCIAL

## 2022-05-10 DIAGNOSIS — L08.9 LOCAL INFECTION OF THE SKIN AND SUBCUTANEOUS TISSUE, UNSPECIFIED: ICD-10-CM

## 2022-05-10 PROCEDURE — 99212 OFFICE O/P EST SF 10 MIN: CPT

## 2022-05-10 NOTE — HISTORY OF PRESENT ILLNESS
[de-identified] : infected right middle finger [de-identified] : pt improved, decreased pain, without swelling, drainage has stopped, denies any fevers or chills.

## 2022-05-10 NOTE — PHYSICAL EXAM
[Normal Breath Sounds] : Normal breath sounds [Normal Heart Sounds] : normal heart sounds [Normal Rate and Rhythm] : normal rate and rhythm [Calm] : calm [de-identified] : well developed female in no distress [de-identified] : without adenopathy [de-identified] : benign [de-identified] : finger is clean and closed, without erythema or drainage

## 2022-05-31 ENCOUNTER — NON-APPOINTMENT (OUTPATIENT)
Age: 57
End: 2022-05-31

## 2022-05-31 ENCOUNTER — APPOINTMENT (OUTPATIENT)
Dept: ORTHOPEDIC SURGERY | Facility: CLINIC | Age: 57
End: 2022-05-31
Payer: COMMERCIAL

## 2022-05-31 VITALS — WEIGHT: 210 LBS | BODY MASS INDEX: 30.06 KG/M2 | HEIGHT: 70 IN

## 2022-05-31 DIAGNOSIS — M76.61 ACHILLES TENDINITIS, RIGHT LEG: ICD-10-CM

## 2022-05-31 PROCEDURE — 99213 OFFICE O/P EST LOW 20 MIN: CPT

## 2022-05-31 NOTE — PHYSICAL EXAM
[Right] : right foot and ankle [5___] : eversion 5[unfilled]/5 [] : non-antalgic [de-identified] : decreased sensation lateral foot at baseline

## 2022-07-26 ENCOUNTER — APPOINTMENT (OUTPATIENT)
Dept: OBGYN | Facility: CLINIC | Age: 57
End: 2022-07-26

## 2022-07-26 VITALS
HEART RATE: 101 BPM | HEIGHT: 70 IN | RESPIRATION RATE: 12 BRPM | SYSTOLIC BLOOD PRESSURE: 132 MMHG | WEIGHT: 212 LBS | BODY MASS INDEX: 30.35 KG/M2 | DIASTOLIC BLOOD PRESSURE: 88 MMHG

## 2022-07-26 DIAGNOSIS — L72.0 EPIDERMAL CYST: ICD-10-CM

## 2022-07-26 PROCEDURE — 99213 OFFICE O/P EST LOW 20 MIN: CPT

## 2022-07-26 NOTE — PLAN
[FreeTextEntry1] : Ceftin x 1 week\par Warm soaks\par Consider I/D if without resolution.\par Patient is well aware.

## 2022-07-26 NOTE — PHYSICAL EXAM
[Appropriately responsive] : appropriately responsive [Alert] : alert [FreeTextEntry8] : 2cm Right sebacious cyst, inflamed..

## 2022-07-26 NOTE — HISTORY OF PRESENT ILLNESS
[postmenopausal] : postmenopausal [N] : Patient does not use contraception [Y] : Positive pregnancy history [Hot Flashes] : hot flashes [Currently Active] : currently active [Men] : men [No] : No [TextBox_4] : Who presents today to evaluate a lesion that she identified recently in her right groin.  This lesion is tender to touch without discharge. [Mammogramdate] : 2021 [TextBox_19] : n/a [BreastSonogramDate] : 2021 [TextBox_25] : n/a [PapSmeardate] : 2022 [TextBox_31] : n/a [BoneDensityDate] : 2021 [TextBox_37] : n/a [ColonoscopyDate] : 2019 [TextBox_43] : n/a [LMPDate] : 2000 [PGHxTotal] : 2 [[facility]] : 1 [FreeTextEntry1] : 2000

## 2022-07-27 DIAGNOSIS — Z82.49 FAMILY HISTORY OF ISCHEMIC HEART DISEASE AND OTHER DISEASES OF THE CIRCULATORY SYSTEM: ICD-10-CM

## 2022-07-27 RX ORDER — CEFUROXIME AXETIL 250 MG/1
250 TABLET ORAL
Qty: 14 | Refills: 0 | Status: DISCONTINUED | COMMUNITY
Start: 2022-07-26 | End: 2022-07-27

## 2023-12-01 DIAGNOSIS — Z11.3 ENCOUNTER FOR SCREENING FOR INFECTIONS WITH A PREDOMINANTLY SEXUAL MODE OF TRANSMISSION: ICD-10-CM

## 2023-12-04 ENCOUNTER — APPOINTMENT (OUTPATIENT)
Dept: OBGYN | Facility: CLINIC | Age: 58
End: 2023-12-04
Payer: COMMERCIAL

## 2023-12-04 VITALS
WEIGHT: 189.38 LBS | OXYGEN SATURATION: 98 % | RESPIRATION RATE: 14 BRPM | HEART RATE: 92 BPM | HEIGHT: 70 IN | SYSTOLIC BLOOD PRESSURE: 120 MMHG | DIASTOLIC BLOOD PRESSURE: 74 MMHG | BODY MASS INDEX: 27.11 KG/M2

## 2023-12-04 DIAGNOSIS — Z01.419 ENCOUNTER FOR GYNECOLOGICAL EXAMINATION (GENERAL) (ROUTINE) W/OUT ABNORMAL FINDINGS: ICD-10-CM

## 2023-12-04 DIAGNOSIS — Z85.71 PERSONAL HISTORY OF HODGKIN LYMPHOMA: ICD-10-CM

## 2023-12-04 DIAGNOSIS — Z12.39 ENCOUNTER FOR OTHER SCREENING FOR MALIGNANT NEOPLASM OF BREAST: ICD-10-CM

## 2023-12-04 DIAGNOSIS — Z85.41 PERSONAL HISTORY OF MALIGNANT NEOPLASM OF CERVIX UTERI: ICD-10-CM

## 2023-12-04 PROCEDURE — 99396 PREV VISIT EST AGE 40-64: CPT

## 2023-12-04 PROCEDURE — 82270 OCCULT BLOOD FECES: CPT

## 2023-12-04 RX ORDER — SULFAMETHOXAZOLE AND TRIMETHOPRIM 800; 160 MG/1; MG/1
800-160 TABLET ORAL
Qty: 14 | Refills: 0 | Status: COMPLETED | COMMUNITY
Start: 2020-09-22 | End: 2023-12-04

## 2023-12-04 RX ORDER — DOXYCYCLINE 100 MG/1
100 TABLET, FILM COATED ORAL
Qty: 14 | Refills: 0 | Status: COMPLETED | COMMUNITY
Start: 2022-07-27 | End: 2023-12-04

## 2023-12-04 RX ORDER — MULTIVITAMIN
CAPSULE ORAL
Refills: 0 | Status: ACTIVE | COMMUNITY

## 2023-12-04 RX ORDER — SODIUM SULFATE, POTASSIUM SULFATE, MAGNESIUM SULFATE 17.5; 3.13; 1.6 G/ML; G/ML; G/ML
17.5-3.13-1.6 SOLUTION, CONCENTRATE ORAL
Qty: 1 | Refills: 0 | Status: COMPLETED | COMMUNITY
Start: 2019-10-29 | End: 2023-12-04

## 2023-12-05 ENCOUNTER — NON-APPOINTMENT (OUTPATIENT)
Age: 58
End: 2023-12-05

## 2023-12-05 LAB
SOURCE AMPLIFICATION: NORMAL
T VAGINALIS RRNA SPEC QL NAA+PROBE: NOT DETECTED

## 2023-12-08 LAB
CYTOLOGY CVX/VAG DOC THIN PREP: NORMAL
HPV 16 E6+E7 MRNA CVX QL NAA+PROBE: NOT DETECTED
HPV18+45 E6+E7 MRNA CVX QL NAA+PROBE: NOT DETECTED

## 2024-05-20 ENCOUNTER — APPOINTMENT (OUTPATIENT)
Dept: OBGYN | Facility: CLINIC | Age: 59
End: 2024-05-20
Payer: COMMERCIAL

## 2024-05-20 VITALS
HEART RATE: 104 BPM | OXYGEN SATURATION: 98 % | WEIGHT: 190 LBS | RESPIRATION RATE: 14 BRPM | BODY MASS INDEX: 27.2 KG/M2 | DIASTOLIC BLOOD PRESSURE: 90 MMHG | HEIGHT: 70 IN | SYSTOLIC BLOOD PRESSURE: 142 MMHG

## 2024-05-20 DIAGNOSIS — R39.15 URGENCY OF URINATION: ICD-10-CM

## 2024-05-20 DIAGNOSIS — M54.50 LOW BACK PAIN, UNSPECIFIED: ICD-10-CM

## 2024-05-20 LAB
BILIRUB UR QL STRIP: NORMAL
CLARITY UR: CLEAR
COLLECTION METHOD: NORMAL
GLUCOSE UR-MCNC: NORMAL
HCG UR QL: 0.2 EU/DL
HGB UR QL STRIP.AUTO: NORMAL
KETONES UR-MCNC: NORMAL
LEUKOCYTE ESTERASE UR QL STRIP: NORMAL
NITRITE UR QL STRIP: NORMAL
PH UR STRIP: 5.5
PROT UR STRIP-MCNC: NORMAL
SP GR UR STRIP: 1

## 2024-05-20 PROCEDURE — 99213 OFFICE O/P EST LOW 20 MIN: CPT | Mod: 25

## 2024-05-20 PROCEDURE — 81003 URINALYSIS AUTO W/O SCOPE: CPT | Mod: QW

## 2024-05-20 NOTE — PLAN
[FreeTextEntry1] : Discussed likelihood of patient having had a kidney stone. Physical exam was normal. Urine sample was collected, Min RBC present, no bacteria.  urine culture to be sent out.  Patient denies continued symptoms other than lower back and abdomen soreness.   If discomfort persists a sono will be obtained with visit in 2 weeks.  If urinary sx persists consider urogyn evaluation.  If episodes of incontinence persists will recommend pelvic floor physical therapy.    I have spent 40 minutes of time on this encounter. Greater than 50% of the face-to-face encounter time was spent on counseling and/or coordination of care for examination, findings, differential, testing, management and planning. 10 minutes were allotted to discussing the depression screening.

## 2024-05-20 NOTE — PHYSICAL EXAM
[Chaperone Present] : A chaperone was present in the examining room during all aspects of the physical examination [75315] : A chaperone was present during the pelvic exam. [Appropriately responsive] : appropriately responsive [Alert] : alert [No Acute Distress] : no acute distress [No Lymphadenopathy] : no lymphadenopathy [Soft] : soft [Non-distended] : non-distended [No HSM] : No HSM [No Lesions] : no lesions [No Mass] : no mass [Oriented x3] : oriented x3 [Labia Majora] : normal [Labia Minora] : normal [Normal] : normal [Uterine Adnexae] : normal [FreeTextEntry2] : Rahel Spring [FreeTextEntry3] : This note was written by Rahel Spring on 05/20/2024, acting as a scribe for Dr. Bradley Renner MD. All medic record entries were at my, Dr. Bradley Renner MD, direction and personally dictated by me in 05/20/2024. I have personally reviewed the chart and agree that the record accurately reflects my personal performance of the history, physical exam, assessment, and plan.  [FreeTextEntry7] : NO CVAT, mild lower back "aches"

## 2024-05-20 NOTE — REVIEW OF SYSTEMS
[Urgency] : urgency [Frequency] : frequency [Dysuria] : dysuria [Pelvic pain] : pelvic pain [Back Pain] : back pain [Negative] : Heme/Lymph [Incontinence] : no incontinence [Urethral Discharge] : no urethral discharge [Abn Vaginal bleeding] : no abnormal vaginal bleeding [CVA Pain] : no CVA pain [Genital Rash/Irritation] : no genital rash/irritation

## 2024-05-22 LAB — BACTERIA UR CULT: NORMAL

## 2024-06-04 ENCOUNTER — APPOINTMENT (OUTPATIENT)
Dept: OBGYN | Facility: CLINIC | Age: 59
End: 2024-06-04
Payer: COMMERCIAL

## 2024-06-04 VITALS
DIASTOLIC BLOOD PRESSURE: 80 MMHG | RESPIRATION RATE: 14 BRPM | WEIGHT: 190 LBS | HEIGHT: 70 IN | BODY MASS INDEX: 27.2 KG/M2 | HEART RATE: 106 BPM | OXYGEN SATURATION: 98 % | SYSTOLIC BLOOD PRESSURE: 120 MMHG

## 2024-06-04 DIAGNOSIS — R35.0 FREQUENCY OF MICTURITION: ICD-10-CM

## 2024-06-04 DIAGNOSIS — Z12.39 ENCOUNTER FOR OTHER SCREENING FOR MALIGNANT NEOPLASM OF BREAST: ICD-10-CM

## 2024-06-04 DIAGNOSIS — Z13.820 ENCOUNTER FOR SCREENING FOR OSTEOPOROSIS: ICD-10-CM

## 2024-06-04 DIAGNOSIS — Z12.4 ENCOUNTER FOR SCREENING FOR MALIGNANT NEOPLASM OF CERVIX: ICD-10-CM

## 2024-06-04 DIAGNOSIS — Z13.31 ENCOUNTER FOR SCREENING FOR DEPRESSION: ICD-10-CM

## 2024-06-04 DIAGNOSIS — Z80.9 FAMILY HISTORY OF MALIGNANT NEOPLASM, UNSPECIFIED: ICD-10-CM

## 2024-06-04 DIAGNOSIS — N95.2 POSTMENOPAUSAL ATROPHIC VAGINITIS: ICD-10-CM

## 2024-06-04 DIAGNOSIS — B37.2 CANDIDIASIS OF SKIN AND NAIL: ICD-10-CM

## 2024-06-04 LAB
BILIRUB UR QL STRIP: NORMAL
CLARITY UR: CLEAR
COLLECTION METHOD: NORMAL
GLUCOSE UR-MCNC: 1000
HCG UR QL: 0.2 EU/DL
HGB UR QL STRIP.AUTO: NORMAL
KETONES UR-MCNC: NORMAL
LEUKOCYTE ESTERASE UR QL STRIP: NORMAL
NITRITE UR QL STRIP: NORMAL
PH UR STRIP: 5.5
PROT UR STRIP-MCNC: NORMAL
SP GR UR STRIP: 1

## 2024-06-04 PROCEDURE — 99214 OFFICE O/P EST MOD 30 MIN: CPT | Mod: 25

## 2024-06-04 PROCEDURE — 76830 TRANSVAGINAL US NON-OB: CPT

## 2024-06-04 PROCEDURE — 99459 PELVIC EXAMINATION: CPT

## 2024-06-04 PROCEDURE — 81003 URINALYSIS AUTO W/O SCOPE: CPT | Mod: QW

## 2024-06-04 PROCEDURE — ZZZZZ: CPT

## 2024-06-04 PROCEDURE — 76857 US EXAM PELVIC LIMITED: CPT | Mod: 59

## 2024-06-04 RX ORDER — KETOCONAZOLE 20 MG/G
2 CREAM TOPICAL TWICE DAILY
Qty: 1 | Refills: 3 | Status: ACTIVE | COMMUNITY
Start: 2024-06-04 | End: 1900-01-01

## 2024-06-04 RX ORDER — ESTRADIOL 0.1 MG/G
0.1 CREAM VAGINAL
Qty: 1 | Refills: 1 | Status: ACTIVE | COMMUNITY
Start: 2023-12-05 | End: 1900-01-01

## 2024-06-04 NOTE — PLAN
[FreeTextEntry1] : Patient noticed a breast rash. A prescription will be written for a cream and will be sent to her pharmacy.  Patient was notified that she currently has candida vaginitis (yeast infection). Diet or tight flitting clothes can play a role in yeast infection onset. As patient is asymptomatic, no action is required.  Patient is aware of their cystocele and rectocele. While typically benign, a cystorectocele can affect quality of life. Surgical options, a pessary, and pelvic floor therapy were discussed. Surgery is currently not recommended for this patient as she is asymptomatic and does not have uterine prolapse. Patient is aware to mention if she notices leaking of urine or stool in the future.  I have spent 40 minutes of time on this encounter. Greater than 50% of the face-to-face encounter time was spent on counseling and/or coordination of care for examination, findings, differential, testing, management and planning. 10 minutes were allotted to discussing the depression screening.

## 2024-06-04 NOTE — PHYSICAL EXAM
[Chaperone Present] : A chaperone was present in the examining room during all aspects of the physical examination [[id number]] : A chaperone was present during the pelvic exam. [Appropriately responsive] : appropriately responsive [Alert] : alert [No Acute Distress] : no acute distress [No Lymphadenopathy] : no lymphadenopathy [Soft] : soft [Non-tender] : non-tender [Non-distended] : non-distended [No HSM] : No HSM [No Lesions] : no lesions [No Mass] : no mass [Oriented x3] : oriented x3 [Examination Of The Breasts] : a normal appearance [No Masses] : no breast masses were palpable [Labia Majora] : normal [Labia Minora] : normal [Normal] : normal [Cystocele] : a cystocele [Uterine Adnexae] : normal [FreeTextEntry2] : Rahel Spring [FreeTextEntry3] : This note was written by Rahel Spring on 06/04/2024, acting as a scribe for Dr. Bradley Renner MD. All medic record entries were at my, Dr. Bradley Renner MD, direction and personally dictated by me in 06/04/2024. I have personally reviewed the chart and agree that the record accurately reflects my personal performance of the history, physical exam, assessment, and plan.  [FreeTextEntry6] : rash underneath breasts [Absent] : absent [FreeTextEntry4] : yeast infection

## 2024-06-04 NOTE — HISTORY OF PRESENT ILLNESS
[postmenopausal] : postmenopausal [N] : Patient reports normal menses [TextBox_4] : Patient presents today for a breast follow-up and a possible UTI. Patient is currently on HRT. [Mammogramdate] : 12/23 [BreastSonogramDate] : 12/23 [PapSmeardate] : 12/23 [BoneDensityDate] : 11/20 [PGHxTotal] : 2 [LMPDate] : 2000 [[facility] Center[facility]] : 1

## 2024-06-04 NOTE — COUNSELING
[Nutrition/ Exercise/ Weight Management] : nutrition, exercise, weight management [Body Image] : body image [Vitamins/Supplements] : vitamins/supplements [Sunscreen] : sunscreen [Breast Self Exam] : breast self exam [Bladder Hygiene] : bladder hygiene [Lab Results] : lab results [Medication Management] : medication management [Other ___] : [unfilled]

## 2024-07-30 PROBLEM — Z85.71 HISTORY OF HODGKIN'S LYMPHOMA: Status: RESOLVED | Noted: 2019-10-02 | Resolved: 2024-07-30

## 2024-09-13 DIAGNOSIS — B37.9 CANDIDIASIS, UNSPECIFIED: ICD-10-CM

## 2024-09-13 RX ORDER — FLUCONAZOLE 150 MG/1
150 TABLET ORAL
Qty: 2 | Refills: 0 | Status: ACTIVE | COMMUNITY
Start: 2024-09-13 | End: 1900-01-01

## 2024-12-06 DIAGNOSIS — Z13.820 ENCOUNTER FOR SCREENING FOR OSTEOPOROSIS: ICD-10-CM

## 2024-12-06 DIAGNOSIS — Z11.3 ENCOUNTER FOR SCREENING FOR INFECTIONS WITH A PREDOMINANTLY SEXUAL MODE OF TRANSMISSION: ICD-10-CM

## 2024-12-09 ENCOUNTER — APPOINTMENT (OUTPATIENT)
Dept: OBGYN | Facility: CLINIC | Age: 59
End: 2024-12-09
Payer: COMMERCIAL

## 2024-12-09 VITALS
HEART RATE: 86 BPM | SYSTOLIC BLOOD PRESSURE: 114 MMHG | DIASTOLIC BLOOD PRESSURE: 78 MMHG | WEIGHT: 170 LBS | OXYGEN SATURATION: 97 % | HEIGHT: 70 IN | RESPIRATION RATE: 14 BRPM | BODY MASS INDEX: 24.34 KG/M2

## 2024-12-09 DIAGNOSIS — Z12.4 ENCOUNTER FOR SCREENING FOR MALIGNANT NEOPLASM OF CERVIX: ICD-10-CM

## 2024-12-09 DIAGNOSIS — N95.2 POSTMENOPAUSAL ATROPHIC VAGINITIS: ICD-10-CM

## 2024-12-09 DIAGNOSIS — Z12.39 ENCOUNTER FOR OTHER SCREENING FOR MALIGNANT NEOPLASM OF BREAST: ICD-10-CM

## 2024-12-09 DIAGNOSIS — Z80.0 ENCOUNTER FOR SCREENING FOR MALIGNANT NEOPLASM OF COLON: ICD-10-CM

## 2024-12-09 DIAGNOSIS — Z12.11 ENCOUNTER FOR SCREENING FOR MALIGNANT NEOPLASM OF COLON: ICD-10-CM

## 2024-12-09 DIAGNOSIS — Z80.9 FAMILY HISTORY OF MALIGNANT NEOPLASM, UNSPECIFIED: ICD-10-CM

## 2024-12-09 DIAGNOSIS — Z80.3 FAMILY HISTORY OF MALIGNANT NEOPLASM OF BREAST: ICD-10-CM

## 2024-12-09 DIAGNOSIS — Z85.41 PERSONAL HISTORY OF MALIGNANT NEOPLASM OF CERVIX UTERI: ICD-10-CM

## 2024-12-09 DIAGNOSIS — Z01.419 ENCOUNTER FOR GYNECOLOGICAL EXAMINATION (GENERAL) (ROUTINE) W/OUT ABNORMAL FINDINGS: ICD-10-CM

## 2024-12-09 PROCEDURE — 36415 COLL VENOUS BLD VENIPUNCTURE: CPT

## 2024-12-09 PROCEDURE — 99396 PREV VISIT EST AGE 40-64: CPT

## 2024-12-09 PROCEDURE — 82270 OCCULT BLOOD FECES: CPT

## 2024-12-09 PROCEDURE — 99459 PELVIC EXAMINATION: CPT

## 2024-12-10 LAB
SOURCE AMPLIFICATION: NORMAL
T VAGINALIS RRNA SPEC QL NAA+PROBE: NOT DETECTED

## 2024-12-11 LAB
HPV 16 E6+E7 MRNA CVX QL NAA+PROBE: NOT DETECTED
HPV18+45 E6+E7 MRNA CVX QL NAA+PROBE: NOT DETECTED

## 2024-12-13 NOTE — BRIEF OPERATIVE NOTE - NSICDXBRIEFPREOP_GEN_ALL_CORE_FT
DISPLAY PLAN FREE TEXT
PRE-OP DIAGNOSIS:  Epidermal inclusion cyst 11-Nov-2019 08:43:43  Charles Huffman
PRE-OP DIAGNOSIS:  Epidermal inclusion cyst 11-Nov-2019 08:43:43  Charles Huffman

## 2024-12-14 LAB — CYTOLOGY CVX/VAG DOC THIN PREP: NORMAL

## 2024-12-31 ENCOUNTER — NON-APPOINTMENT (OUTPATIENT)
Age: 59
End: 2024-12-31

## 2025-01-02 ENCOUNTER — NON-APPOINTMENT (OUTPATIENT)
Age: 60
End: 2025-01-02

## 2025-01-21 ENCOUNTER — APPOINTMENT (OUTPATIENT)
Dept: GASTROENTEROLOGY | Facility: CLINIC | Age: 60
End: 2025-01-21

## 2025-01-21 VITALS
SYSTOLIC BLOOD PRESSURE: 135 MMHG | TEMPERATURE: 98.1 F | HEIGHT: 70 IN | OXYGEN SATURATION: 98 % | DIASTOLIC BLOOD PRESSURE: 77 MMHG | BODY MASS INDEX: 24.34 KG/M2 | HEART RATE: 91 BPM | WEIGHT: 170 LBS

## 2025-01-21 DIAGNOSIS — E11.9 TYPE 2 DIABETES MELLITUS W/OUT COMPLICATIONS: ICD-10-CM

## 2025-01-21 DIAGNOSIS — Z80.0 ENCOUNTER FOR SCREENING FOR MALIGNANT NEOPLASM OF COLON: ICD-10-CM

## 2025-01-21 DIAGNOSIS — Z12.11 ENCOUNTER FOR SCREENING FOR MALIGNANT NEOPLASM OF COLON: ICD-10-CM

## 2025-01-21 DIAGNOSIS — E78.00 PURE HYPERCHOLESTEROLEMIA, UNSPECIFIED: ICD-10-CM

## 2025-01-21 DIAGNOSIS — K62.1 RECTAL POLYP: ICD-10-CM

## 2025-01-21 PROCEDURE — 99204 OFFICE O/P NEW MOD 45 MIN: CPT

## 2025-01-21 RX ORDER — SODIUM SULFATE, POTASSIUM SULFATE AND MAGNESIUM SULFATE 1.6; 3.13; 17.5 G/177ML; G/177ML; G/177ML
17.5-3.13-1.6 SOLUTION ORAL
Qty: 2 | Refills: 0 | Status: ACTIVE | COMMUNITY
Start: 2025-01-21 | End: 1900-01-01

## 2025-01-21 RX ORDER — ATORVASTATIN CALCIUM 20 MG/1
20 TABLET, FILM COATED ORAL DAILY
Qty: 30 | Refills: 9 | Status: ACTIVE | COMMUNITY
Start: 2025-01-21

## 2025-01-21 RX ORDER — METFORMIN HYDROCHLORIDE 500 MG/1
500 TABLET, COATED ORAL
Refills: 0 | Status: ACTIVE | COMMUNITY

## 2025-02-20 ENCOUNTER — APPOINTMENT (OUTPATIENT)
Dept: GASTROENTEROLOGY | Facility: AMBULATORY MEDICAL SERVICES | Age: 60
End: 2025-02-20
Payer: COMMERCIAL

## 2025-02-20 PROCEDURE — 45380 COLONOSCOPY AND BIOPSY: CPT | Mod: 33

## 2025-03-13 ENCOUNTER — NON-APPOINTMENT (OUTPATIENT)
Age: 60
End: 2025-03-13

## 2025-03-13 ENCOUNTER — APPOINTMENT (OUTPATIENT)
Dept: GASTROENTEROLOGY | Facility: CLINIC | Age: 60
End: 2025-03-13
Payer: COMMERCIAL

## 2025-03-13 VITALS
TEMPERATURE: 98.1 F | DIASTOLIC BLOOD PRESSURE: 74 MMHG | HEART RATE: 95 BPM | SYSTOLIC BLOOD PRESSURE: 125 MMHG | BODY MASS INDEX: 24.34 KG/M2 | WEIGHT: 170 LBS | OXYGEN SATURATION: 99 % | HEIGHT: 70 IN

## 2025-03-13 DIAGNOSIS — Z80.9 FAMILY HISTORY OF MALIGNANT NEOPLASM, UNSPECIFIED: ICD-10-CM

## 2025-03-13 DIAGNOSIS — D12.0 BENIGN NEOPLASM OF CECUM: ICD-10-CM

## 2025-03-13 DIAGNOSIS — E11.9 TYPE 2 DIABETES MELLITUS W/OUT COMPLICATIONS: ICD-10-CM

## 2025-03-13 PROCEDURE — 99214 OFFICE O/P EST MOD 30 MIN: CPT

## 2025-05-01 NOTE — HISTORY OF PRESENT ILLNESS
Home care nursing and therapy services have been arranged for you through [facility] (formerly [facility]). Your [facility] therapist will contact you prior to your first home visit. For reference, the main [facility]  number is [phone].      Want to Say “Thank You” to a Nurse?  The KATHY Award® was created in memory of MARSHAL Jacques by his family to say thank you to nurses who provide an outstanding level of care.  Prerna Nova was your care coordinator. I enjoyed working with Traci every day. I hope for the best as you continue recovery at home.  Submit a nomination using any method below.     OR    [email or web address]  Or visit the Resource section   on your [employer]       [postmenopausal] : postmenopausal [Y] : Patient is sexually active [TextBox_4] : Patient suspects she has a UTI. Over the weekend she got home and felt an urgency to urinate but could not release any urine. She said there was extreme pain radiating from her lower back. Pain persistent for several hours until she was able to urinate several hours later. Patient notes she vomited as well. She notes a sweet-smelling urine. She notes continuing frequency, urgency and tenderness. [Mammogramdate] : 12/05/2023 [BreastSonogramDate] : 12/05/2023 [PapSmeardate] : 12/04/2023 [BoneDensityDate] : 2020 [ColonoscopyDate] : 12/11/2019 [TextBox_78] : No history of HPV

## 2025-06-16 ENCOUNTER — APPOINTMENT (OUTPATIENT)
Dept: OBGYN | Facility: CLINIC | Age: 60
End: 2025-06-16
Payer: COMMERCIAL

## 2025-06-16 VITALS
OXYGEN SATURATION: 99 % | BODY MASS INDEX: 26.66 KG/M2 | SYSTOLIC BLOOD PRESSURE: 110 MMHG | HEART RATE: 88 BPM | WEIGHT: 180 LBS | DIASTOLIC BLOOD PRESSURE: 62 MMHG | HEIGHT: 69 IN | RESPIRATION RATE: 16 BRPM

## 2025-06-16 PROCEDURE — 99214 OFFICE O/P EST MOD 30 MIN: CPT

## 2025-06-16 PROCEDURE — 99459 PELVIC EXAMINATION: CPT | Mod: NC

## 2025-06-17 LAB — HPV HIGH+LOW RISK DNA PNL CVX: NOT DETECTED

## 2025-06-19 LAB — CYTOLOGY CVX/VAG DOC THIN PREP: ABNORMAL
